# Patient Record
(demographics unavailable — no encounter records)

---

## 2025-02-10 NOTE — PHYSICAL EXAM
[Examination Of The Breasts] : a normal appearance [No Masses] : no breast masses were palpable [Labia Majora] : normal [Labia Minora] : normal [Normal] : normal [Uterine Adnexae] : normal [[email or web address]] : uterine prolapse to the introitus with valsalva